# Patient Record
Sex: FEMALE | Race: WHITE | URBAN - NONMETROPOLITAN AREA
[De-identification: names, ages, dates, MRNs, and addresses within clinical notes are randomized per-mention and may not be internally consistent; named-entity substitution may affect disease eponyms.]

---

## 2021-07-27 LAB
BILIRUB SERPL-MCNC: NEGATIVE MG/DL
BLOOD URINE, POC: NEGATIVE
COLOR, POC UA: YELLOW
GLUCOSE UR QL STRIP: NEGATIVE
KETONES UR QL STRIP: NORMAL
LEUKOCYTE EST, POC UA: NORMAL
NITRITE, POC UA: NEGATIVE
PH, POC UA: 7
POC BETA-HCG (QUAL): POSITIVE
PROTEIN, POC: NEGATIVE
UROBILINOGEN, POC UA: NORMAL

## 2021-07-29 LAB
HUMAN PAPILLOMAVIRUS (HPV): NORMAL
PAP RECOMMENDATION EXT: ABNORMAL
PAP SMEAR: ABNORMAL

## 2021-08-10 ENCOUNTER — HISTORICAL (OUTPATIENT)
Dept: ADMINISTRATIVE | Facility: HOSPITAL | Age: 26
End: 2021-08-10

## 2021-08-31 LAB
CLARITY, POC UA: CLEAR
COLOR, POC UA: YELLOW
GLUCOSE UR QL STRIP: NEGATIVE
LEUKOCYTE EST, POC UA: NEGATIVE
NITRITE, POC UA: POSITIVE
PROTEIN, POC: NEGATIVE

## 2021-09-07 LAB
CLARITY, POC UA: CLEAR
COLOR, POC UA: YELLOW
GLUCOSE 1H P 100 G GLC PO SERPL-MCNC: 125 MG/DL (ref 70–140)
GLUCOSE UR QL STRIP: NEGATIVE
LEUKOCYTE EST, POC UA: NORMAL
NITRITE, POC UA: NEGATIVE
PROTEIN, POC: NEGATIVE

## 2021-09-15 LAB
CLARITY, POC UA: CLEAR
COLOR, POC UA: YELLOW
GLUCOSE UR QL STRIP: NEGATIVE
LEUKOCYTE EST, POC UA: NEGATIVE
NITRITE, POC UA: NEGATIVE
PROTEIN, POC: NEGATIVE

## 2021-10-11 LAB
CLARITY, POC UA: CLEAR
COLOR, POC UA: YELLOW
ERYTHROCYTE [DISTWIDTH] IN BLOOD BY AUTOMATED COUNT: 13.5 % (ref 11–14.5)
GLUCOSE UR QL STRIP: NEGATIVE
HCT VFR BLD AUTO: 36.4 % (ref 36–48)
HGB BLD-MCNC: 11.9 G/DL (ref 11.8–16)
LEUKOCYTE EST, POC UA: NEGATIVE
MCH RBC QN AUTO: 32.2 PG (ref 27–34)
MCHC RBC AUTO-ENTMCNC: 32.7 G/DL (ref 31–37)
MCV RBC AUTO: 98.4 FL (ref 79–99)
NITRITE, POC UA: NEGATIVE
PLATELET # BLD AUTO: 276 X10(3)/MCL (ref 140–371)
PMV BLD AUTO: 9.5 FL (ref 9.4–12.4)
PROTEIN, POC: NEGATIVE
RBC # BLD AUTO: 3.7 X10(6)/MCL (ref 4–5.1)
RPR SER QL: NORMAL
WBC # SPEC AUTO: 11.2 X10(3)/MCL (ref 4–11.5)

## 2021-11-22 LAB
CLARITY, POC UA: CLEAR
COLOR, POC UA: YELLOW
GLUCOSE UR QL STRIP: NEGATIVE
LEUKOCYTE EST, POC UA: NEGATIVE
NITRITE, POC UA: NEGATIVE
POC SITE: NORMAL
PROTEIN, POC: NEGATIVE

## 2021-11-23 ENCOUNTER — HISTORICAL (OUTPATIENT)
Dept: ADMINISTRATIVE | Facility: HOSPITAL | Age: 26
End: 2021-11-23

## 2021-11-26 ENCOUNTER — HISTORICAL (OUTPATIENT)
Dept: ADMINISTRATIVE | Facility: HOSPITAL | Age: 26
End: 2021-11-26

## 2021-11-29 LAB
CLARITY, POC UA: NORMAL
COLOR, POC UA: NORMAL
GLUCOSE UR QL STRIP: NEGATIVE
LEUKOCYTE EST, POC UA: NEGATIVE
NITRITE, POC UA: NEGATIVE
PROTEIN, POC: NEGATIVE

## 2021-11-30 LAB
ERYTHROCYTE [DISTWIDTH] IN BLOOD BY AUTOMATED COUNT: 13.9 % (ref 11–14.5)
HBV SURFACE AG SERPL QL IA: NEGATIVE
HCT VFR BLD AUTO: 36.3 % (ref 36–48)
HGB BLD-MCNC: 12.2 G/DL (ref 11.8–16)
HIV 1+2 AB+HIV1 P24 AG SERPL QL IA: NORMAL
MCH RBC QN AUTO: 31.8 PG (ref 27–34)
MCHC RBC AUTO-ENTMCNC: 33.6 G/DL (ref 31–37)
MCV RBC AUTO: 94.5 FL (ref 79–99)
PLATELET # BLD AUTO: 251 X10(3)/MCL (ref 140–371)
PMV BLD AUTO: 9.3 FL (ref 9.4–12.4)
RBC # BLD AUTO: 3.84 X10(6)/MCL (ref 4–5.1)
RPR SER QL: NORMAL
WBC # SPEC AUTO: 15.2 X10(3)/MCL (ref 4–11.5)

## 2022-01-12 LAB — POC BETA-HCG (QUAL): NEGATIVE

## 2022-04-10 ENCOUNTER — HISTORICAL (OUTPATIENT)
Dept: ADMINISTRATIVE | Facility: HOSPITAL | Age: 27
End: 2022-04-10

## 2022-04-27 VITALS
BODY MASS INDEX: 22.93 KG/M2 | WEIGHT: 129.44 LBS | DIASTOLIC BLOOD PRESSURE: 64 MMHG | OXYGEN SATURATION: 99 % | SYSTOLIC BLOOD PRESSURE: 112 MMHG | HEIGHT: 63 IN

## 2022-04-30 ENCOUNTER — HISTORICAL (OUTPATIENT)
Dept: ADMINISTRATIVE | Facility: HOSPITAL | Age: 27
End: 2022-04-30

## 2022-05-03 NOTE — HISTORICAL [facility] CERNER
This is a historical note converted from [employer]it[employer]. Formatting and pictures may have been removed.  Please reference [employer]itza for original formatting and attached multimedia. Chief Complaint  New Ob  History of Present Illness  25yo WF  presents for New OB visit. Went to [facility] 2021 for bleeding, states resolved no complaints today. MBT: A positive.  LMP/EGA/SIN  Gestational Age (EGA) and SIN?? ? * Note: EGA calculated as of 2021  ?  SIN:?2022???EGA*:?13 weeks 2 days ? ? ? ? ? ?Type:?Authoritative??????Method Date:?2021  ?  ?? ? ?Method:?Last Menstrual Period?(2021)  ?? ? ?Confirmation:?Confirmed  ?? ? ?Description:?--  ?? ? ?Comments:?--  ?? ? ?Entered by:?Graciela Barrios on 2021?  ?  Other SIN Calculations for this Pregnancy:  ?? ? ?No additional SIN calculations have been recorded for this pregnancy  Gynecological History  Last Menstrual Period: 21  Menstrual Status Intake: Menarcheal  Age of Menarche: 17  STIs/STDs: No  Intermenstrual Bleeding: No  Current Birth Control Method: Pregnant  Dysmenorrhea: Moderate  HPV Vaccine: No  Flow: Moderate  Dyspareunia: No  Duration of Flow: 7  Postcoital Bleeding: No  Frequency of Cycle: monthly  Dysuria: No  Additional GYN Information: has had pap before does not remember when  Discharge OB: denies  Sexual Problems OB: denies  Urinary Incontinence: denies  Sexually Active: Yes  Review of Systems  General/Constitutional:?  Chills?denies. Fatigue/weakness?denies?. Fever?denies?. Night sweats?denies?.  Skin:  Rash?denies.  Respiratory:  Cough?denies?. Hemoptysis?denies?. SOB?denies?. Sputum production?denies?. Wheezing?denies?.  Cardiovascular:  Chest pain?denies. Dizziness?denies. Palpitations?denies. Swelling in hands/feet?denies.  Breast:  Changes in skin of nipple or breast?denies?. Breast lump?denies. Breast pain?denies. Nipple discharge?denies?.  Gastrointestinal:  Abdominal pain?denies?. Blood in stool?denies?. Constipation?denies?.  Diarrhea?denies?. Heartburn?denies?. Nausea?denies?. Vomiting?denies  Genitourinary:  Incontinence?denies?. Blood in urine?denies. Frequent urination?denies?. Painful urination?denies.  Gynecologic:  Irregular menses?denies. Heavy bleeding?denies. Painful menses?denies. Vaginal discharge/ Odor?denies?. Vaginal lesion/pain?denies. ?Pelvic pain?denies?. Decreased libido?denies. Vulvar lesion/ulcer?denies?. Prolapse of genital organs?denies?. Painful intercourse?denies?.  Menopausal:  Hot flushes?denies. Vaginal dryness?denies.  Musculoskeletal:  Joint/missy pain?denies. Decreased ROM?denies. Muscle aches?denies. Swollen joints?denies?. Weakness?denies.  Neurologic:  Confusion?denies. Trouble walking?denies. Trouble with balance?denies?Balance difficulty?denies. Gait abnormalities?denies. Headache?denies. Tingling/Numbness?denies.  Psychiatric:  Mood lability?denies.?Anxiety or panic attacks?denies. Depressed mood?denies. Suicidal thoughts?denies.?  Physical Exam  Vitals & Measurements  T:?36.1? ?C (Temporal Artery)? BP:?112/70?  HT:?157.00?cm? WT:?52.100?kg? BMI:?21.14?  Chaperone: present  ?   General appearance: - healthy, well-nourished and well-developed  ?   Psychiatric: Orientation to time, place and person. Normal mood and affect and active, alert  ?   Skin:  Appearance: no rashes or lesions  ?   Neck:  Neck: supple, FROM, trachea midline. and no masses  Thyroid: no enlargement or nodules and non-tender.  ?  ?   Cardiovascular:  Auscultation: RRR and no murmur  Peripheral Vascular: no varicosities, LLE edema, RLE edema, calf tenderness, and palpable cord and pedal pulses intact  ?   Lungs:  Respiratory effort: no intercostal retractions or accessory muscle usage  Auscultation: no wheezing, rales/crackles, or rhonchi and clear to auscultation  ?  Breast:  Inspection/Palpation: no tenderness, discrete/distinct masses, skin changes, or abnormal secretions. Nipple appearance  normal.  ?  Abdomen:  Auscultation/Inspection/Palpation: no hepatomegaly, splenomegaly, masses , tenderness? or CVA tenderness and soft, non distended bowel sounds preset  Hernia: no palpable hernias  ?  Female Genitalia:  Vulva: no masses,?tenderness or?lesions  Bladder/Urethra: no urethral discharge or mass, normal meatus, bladder non-distended  Vagina: no tenderness, erythema, cystocele, rectocele, abnormal vaginal discharge, or vesicle(s) or ulcers  Cervix: no discharge , no cervical lacerations noted or motion tenderness and grossly normal  Uterus: normal size and shape and midline, non-tender, and no uterine prolapse.  Adnexa/Parametria: no parametrial tenderness or mass, no adnexal tenderness or ovarian mass  ?  Lymph Nodes:  Palpation: non tender submandibular nodes, axillary nodes, or inguinal nodes  ?  Rectal Exam:  Rectum: normal perianal skin  Assessment/Plan  1.?Pregnant?Z34.90  2.?18 weeks gestation of pregnancy?Z3A.18  - We discussed diet and supplements and modifiable risk factors.  ?   - Patient advised to continue moderate physical activity.  ?   - Patient will report unusual headaches, visual disturbances, pelvic pain or cramping, vaginal bleeding, rupture of membranes, extreme swelling in hands or face, diminished urine volume, any prolonged illness or infection, or persistent symptoms of labor.  ?   - Discussed with patient on?the uncertainty of?COVID 19?in relation to?pregnancy?complications with patient and unborn fetus.? Advised to CDC guidelines of social distancing ,patient to stay home as much as possible, and?limit contact with others.? Travel restrictions discussed. ?Patient to call office if she has?a temperature over 100.4?cough shortness of breath?or GI symptoms. Patient educated if?she feels she is in an emergency to call 911.?  ?   - Pap GC/CZ/TV  ?   - PNL  ?   - Anatomy US/Quad screen  ?   - RTC in 4 weeks for OB check.  ?   OB History  Pregnancy History???(0,0,0,0)??  ??  ?  ??No previous pregnancies history have been recorded  Problem List/Past Medical History  Ongoing  Pregnant  Historical  No qualifying data  Medications  No active medications  Allergies  No Known Medication Allergies  Social History  Abuse/Neglect  No, 01/12/2021  Alcohol - Denies Alcohol Use, 07/13/2015  Never, Household alcohol concerns: No., 08/25/2017  Home/Environment  Living situation: Home/Independent., 10/09/2018  Sexual  Sexually active: Yes. No, 07/27/2021  Substance Use  Never, Household substance abuse concerns: No., 08/25/2017  Tobacco - High Risk, 07/13/2015  Never (less than 100 in lifetime), No, 01/12/2021  Never smoker, Household tobacco concerns: No., 08/25/2017  Family History  Breast cancer: Negative: Maternal Grandmother.  Diabetes mellitus type 2: Father.  Primary malignant neoplasm of breast: Grandfather and Grandmother.  Health Maintenance  Health Maintenance  ???Pending?(in the next year)  ??? ??OverDue  ??? ? ? ?Influenza Vaccine due??10/01/20??and every 1??day(s)  ??? ? ? ?Alcohol Misuse Screening due??01/02/21??and every 1??year(s)  ??? ??Due?  ??? ? ? ?ADL Screening due??07/27/21??and every 1??year(s)  ??? ? ? ?Cervical Cancer Screening due??07/27/21??Unknown Frequency  ??? ? ? ?Depression Screening due??07/27/21??Unknown Frequency  ??? ? ? ?Tetanus Vaccine due??07/27/21??and every 10??year(s)  ??? ??Due In Future?  ??? ? ? ?Obesity Screening not due until??01/01/22??and every 1??year(s)  ???Satisfied?(in the past 1 year)  ??? ??Satisfied?  ??? ? ? ?Blood Pressure Screening on??07/27/21.??Satisfied by Sharyn Wen  ??? ? ? ?Body Mass Index Check on??07/27/21.??Satisfied by Sharny Wen  ??? ? ? ?Obesity Screening on??07/27/21.??Satisfied by Sharyn Wen  ?

## 2022-09-16 ENCOUNTER — HISTORICAL (OUTPATIENT)
Dept: ADMINISTRATIVE | Facility: HOSPITAL | Age: 27
End: 2022-09-16

## 2022-09-17 ENCOUNTER — HISTORICAL (OUTPATIENT)
Dept: ADMINISTRATIVE | Facility: HOSPITAL | Age: 27
End: 2022-09-17

## 2023-02-06 ENCOUNTER — DOCUMENTATION ONLY (OUTPATIENT)
Dept: ADMINISTRATIVE | Facility: HOSPITAL | Age: 28
End: 2023-02-06